# Patient Record
Sex: MALE | Race: WHITE | URBAN - METROPOLITAN AREA
[De-identification: names, ages, dates, MRNs, and addresses within clinical notes are randomized per-mention and may not be internally consistent; named-entity substitution may affect disease eponyms.]

---

## 2019-06-17 RX ORDER — IBUPROFEN 200 MG
600 TABLET ORAL EVERY 6 HOURS PRN
COMMUNITY

## 2019-06-17 ASSESSMENT — MIFFLIN-ST. JEOR: SCORE: 1624.89

## 2019-06-19 ENCOUNTER — HOSPITAL ENCOUNTER (OUTPATIENT)
Facility: CLINIC | Age: 29
Discharge: HOME | End: 2019-06-19
Attending: OPHTHALMOLOGY | Admitting: OPHTHALMOLOGY
Payer: COMMERCIAL

## 2019-06-19 ENCOUNTER — ANESTHESIA (OUTPATIENT)
Dept: SURGERY | Facility: CLINIC | Age: 29
End: 2019-06-19
Payer: COMMERCIAL

## 2019-06-19 ENCOUNTER — ANESTHESIA EVENT (OUTPATIENT)
Dept: SURGERY | Facility: CLINIC | Age: 29
End: 2019-06-19
Payer: COMMERCIAL

## 2019-06-19 VITALS
BODY MASS INDEX: 20.47 KG/M2 | HEIGHT: 70 IN | SYSTOLIC BLOOD PRESSURE: 94 MMHG | DIASTOLIC BLOOD PRESSURE: 55 MMHG | WEIGHT: 143 LBS | TEMPERATURE: 97.9 F | RESPIRATION RATE: 16 BRPM | OXYGEN SATURATION: 97 %

## 2019-06-19 PROCEDURE — 25000125 ZZHC RX 250: Performed by: ANESTHESIOLOGY

## 2019-06-19 PROCEDURE — 25000125 ZZHC RX 250

## 2019-06-19 PROCEDURE — 71000028 ZZH EYE RECOVERY PHASE 2 EACH 15 MINS: Performed by: OPHTHALMOLOGY

## 2019-06-19 PROCEDURE — 25000128 H RX IP 250 OP 636

## 2019-06-19 PROCEDURE — 25000125 ZZHC RX 250: Performed by: OPHTHALMOLOGY

## 2019-06-19 PROCEDURE — 40000170 ZZH STATISTIC PRE-PROCEDURE ASSESSMENT II: Performed by: OPHTHALMOLOGY

## 2019-06-19 PROCEDURE — V2632 POST CHMBR INTRAOCULAR LENS: HCPCS | Performed by: OPHTHALMOLOGY

## 2019-06-19 PROCEDURE — 25800030 ZZH RX IP 258 OP 636: Performed by: ANESTHESIOLOGY

## 2019-06-19 PROCEDURE — 25000128 H RX IP 250 OP 636: Performed by: OPHTHALMOLOGY

## 2019-06-19 PROCEDURE — 37000008 ZZH ANESTHESIA TECHNICAL FEE, 1ST 30 MIN: Performed by: OPHTHALMOLOGY

## 2019-06-19 PROCEDURE — 36000101 ZZH EYE SURGERY LEVEL 3 1ST 30 MIN: Performed by: OPHTHALMOLOGY

## 2019-06-19 PROCEDURE — 27210794 ZZH OR GENERAL SUPPLY STERILE: Performed by: OPHTHALMOLOGY

## 2019-06-19 PROCEDURE — 25000132 ZZH RX MED GY IP 250 OP 250 PS 637: Performed by: ANESTHESIOLOGY

## 2019-06-19 PROCEDURE — 25800030 ZZH RX IP 258 OP 636

## 2019-06-19 DEVICE — EYE IMP IOL AMO PCL TECNIS ZCB00 13.0: Type: IMPLANTABLE DEVICE | Site: EYE | Status: FUNCTIONAL

## 2019-06-19 RX ORDER — PROPARACAINE HYDROCHLORIDE 5 MG/ML
1 SOLUTION/ DROPS OPHTHALMIC ONCE
Status: COMPLETED | OUTPATIENT
Start: 2019-06-19 | End: 2019-06-19

## 2019-06-19 RX ORDER — PHENYLEPHRINE HYDROCHLORIDE 25 MG/ML
1 SOLUTION/ DROPS OPHTHALMIC
Status: COMPLETED | OUTPATIENT
Start: 2019-06-19 | End: 2019-06-19

## 2019-06-19 RX ORDER — TROPICAMIDE 10 MG/ML
1 SOLUTION/ DROPS OPHTHALMIC
Status: COMPLETED | OUTPATIENT
Start: 2019-06-19 | End: 2019-06-19

## 2019-06-19 RX ORDER — MOXIFLOXACIN 5 MG/ML
SOLUTION/ DROPS OPHTHALMIC PRN
Status: DISCONTINUED | OUTPATIENT
Start: 2019-06-19 | End: 2019-06-19 | Stop reason: HOSPADM

## 2019-06-19 RX ORDER — LIDOCAINE 40 MG/G
CREAM TOPICAL
Status: DISCONTINUED | OUTPATIENT
Start: 2019-06-19 | End: 2019-06-19 | Stop reason: HOSPADM

## 2019-06-19 RX ORDER — LIDOCAINE HYDROCHLORIDE 10 MG/ML
INJECTION, SOLUTION EPIDURAL; INFILTRATION; INTRACAUDAL; PERINEURAL PRN
Status: DISCONTINUED | OUTPATIENT
Start: 2019-06-19 | End: 2019-06-19 | Stop reason: HOSPADM

## 2019-06-19 RX ORDER — ACETAMINOPHEN 500 MG
1000 TABLET ORAL ONCE
Status: COMPLETED | OUTPATIENT
Start: 2019-06-19 | End: 2019-06-19

## 2019-06-19 RX ORDER — TIMOLOL 5 MG/ML
SOLUTION/ DROPS OPHTHALMIC PRN
Status: DISCONTINUED | OUTPATIENT
Start: 2019-06-19 | End: 2019-06-19 | Stop reason: HOSPADM

## 2019-06-19 RX ORDER — ONDANSETRON 2 MG/ML
INJECTION INTRAMUSCULAR; INTRAVENOUS PRN
Status: DISCONTINUED | OUTPATIENT
Start: 2019-06-19 | End: 2019-06-19

## 2019-06-19 RX ORDER — MOXIFLOXACIN 5 MG/ML
1 SOLUTION/ DROPS OPHTHALMIC
Status: COMPLETED | OUTPATIENT
Start: 2019-06-19 | End: 2019-06-19

## 2019-06-19 RX ORDER — BALANCED SALT SOLUTION 6.4; .75; .48; .3; 3.9; 1.7 MG/ML; MG/ML; MG/ML; MG/ML; MG/ML; MG/ML
SOLUTION OPHTHALMIC PRN
Status: DISCONTINUED | OUTPATIENT
Start: 2019-06-19 | End: 2019-06-19 | Stop reason: HOSPADM

## 2019-06-19 RX ORDER — DICLOFENAC SODIUM 1 MG/ML
1 SOLUTION/ DROPS OPHTHALMIC
Status: COMPLETED | OUTPATIENT
Start: 2019-06-19 | End: 2019-06-19

## 2019-06-19 RX ORDER — SODIUM CHLORIDE, SODIUM LACTATE, POTASSIUM CHLORIDE, CALCIUM CHLORIDE 600; 310; 30; 20 MG/100ML; MG/100ML; MG/100ML; MG/100ML
500 INJECTION, SOLUTION INTRAVENOUS CONTINUOUS
Status: DISCONTINUED | OUTPATIENT
Start: 2019-06-19 | End: 2019-06-19 | Stop reason: HOSPADM

## 2019-06-19 RX ORDER — TETRACAINE HYDROCHLORIDE 5 MG/ML
SOLUTION OPHTHALMIC PRN
Status: DISCONTINUED | OUTPATIENT
Start: 2019-06-19 | End: 2019-06-19 | Stop reason: HOSPADM

## 2019-06-19 RX ADMIN — DEXMEDETOMIDINE HYDROCHLORIDE 8 MCG: 100 INJECTION, SOLUTION INTRAVENOUS at 07:41

## 2019-06-19 RX ADMIN — DICLOFENAC SODIUM 1 DROP: 1 SOLUTION OPHTHALMIC at 06:33

## 2019-06-19 RX ADMIN — TROPICAMIDE 1 DROP: 10 SOLUTION/ DROPS OPHTHALMIC at 06:23

## 2019-06-19 RX ADMIN — PHENYLEPHRINE HYDROCHLORIDE 1 DROP: 25 SOLUTION/ DROPS OPHTHALMIC at 06:23

## 2019-06-19 RX ADMIN — TROPICAMIDE 1 DROP: 10 SOLUTION/ DROPS OPHTHALMIC at 06:33

## 2019-06-19 RX ADMIN — PROPARACAINE HYDROCHLORIDE 1 DROP: 5 SOLUTION/ DROPS OPHTHALMIC at 06:28

## 2019-06-19 RX ADMIN — PHENYLEPHRINE HYDROCHLORIDE 1 DROP: 25 SOLUTION/ DROPS OPHTHALMIC at 06:33

## 2019-06-19 RX ADMIN — MIDAZOLAM HYDROCHLORIDE 2 MG: 1 INJECTION, SOLUTION INTRAMUSCULAR; INTRAVENOUS at 07:33

## 2019-06-19 RX ADMIN — ONDANSETRON 4 MG: 2 INJECTION INTRAMUSCULAR; INTRAVENOUS at 07:37

## 2019-06-19 RX ADMIN — PROPARACAINE HYDROCHLORIDE 1 DROP: 5 SOLUTION/ DROPS OPHTHALMIC at 06:23

## 2019-06-19 RX ADMIN — MOXIFLOXACIN HYDROCHLORIDE 1 DROP: 5 SOLUTION/ DROPS OPHTHALMIC at 06:23

## 2019-06-19 RX ADMIN — DEXMEDETOMIDINE HYDROCHLORIDE 12 MCG: 100 INJECTION, SOLUTION INTRAVENOUS at 07:37

## 2019-06-19 RX ADMIN — MOXIFLOXACIN HYDROCHLORIDE 1 DROP: 5 SOLUTION/ DROPS OPHTHALMIC at 06:28

## 2019-06-19 RX ADMIN — POVIDONE-IODINE 1 DROP: 5 SOLUTION OPHTHALMIC at 06:23

## 2019-06-19 RX ADMIN — SODIUM CHLORIDE, POTASSIUM CHLORIDE, SODIUM LACTATE AND CALCIUM CHLORIDE 500 ML: 600; 310; 30; 20 INJECTION, SOLUTION INTRAVENOUS at 06:36

## 2019-06-19 RX ADMIN — TROPICAMIDE 1 DROP: 10 SOLUTION/ DROPS OPHTHALMIC at 06:28

## 2019-06-19 RX ADMIN — ACETAMINOPHEN 1000 MG: 500 TABLET, FILM COATED ORAL at 08:53

## 2019-06-19 RX ADMIN — DICLOFENAC SODIUM 1 DROP: 1 SOLUTION OPHTHALMIC at 06:29

## 2019-06-19 RX ADMIN — LIDOCAINE HYDROCHLORIDE: 10 INJECTION, SOLUTION EPIDURAL; INFILTRATION; INTRACAUDAL; PERINEURAL at 06:36

## 2019-06-19 RX ADMIN — PHENYLEPHRINE HYDROCHLORIDE 1 DROP: 25 SOLUTION/ DROPS OPHTHALMIC at 06:28

## 2019-06-19 RX ADMIN — DICLOFENAC SODIUM 1 DROP: 1 SOLUTION OPHTHALMIC at 06:32

## 2019-06-19 RX ADMIN — MIDAZOLAM HYDROCHLORIDE 1 MG: 1 INJECTION, SOLUTION INTRAMUSCULAR; INTRAVENOUS at 07:43

## 2019-06-19 RX ADMIN — MOXIFLOXACIN HYDROCHLORIDE 1 DROP: 5 SOLUTION/ DROPS OPHTHALMIC at 06:33

## 2019-06-19 SDOH — HEALTH STABILITY: MENTAL HEALTH: HOW OFTEN DO YOU HAVE A DRINK CONTAINING ALCOHOL?: NEVER

## 2019-06-19 ASSESSMENT — ENCOUNTER SYMPTOMS: SEIZURES: 0

## 2019-06-19 NOTE — ANESTHESIA POSTPROCEDURE EVALUATION
Patient: Nixon Shen    Procedure(s):  RIGHT EYE CATARACT EXTRACTION WITH CORNEA INTRAOCULAR LENS IMPLANT    Diagnosis:CATARACT RIGHT EYE  Diagnosis Additional Information: No value filed.    Anesthesia Type:  MAC    Note:  Anesthesia Post Evaluation    Patient location during evaluation: PACU  Patient participation: Able to fully participate in evaluation  Level of consciousness: awake  Pain management: adequate  Airway patency: patent  Cardiovascular status: acceptable  Respiratory status: acceptable  Hydration status: acceptable  PONV: none     Anesthetic complications: None          Last vitals:  Vitals:    06/19/19 0622 06/19/19 0800 06/19/19 0822   BP: 114/66 96/54 94/55   Resp: 16 16 16   Temp: 36.6  C (97.9  F)     SpO2: 98% 96% 97%         Electronically Signed By: Basim Mitchell MD  June 19, 2019  9:24 AM

## 2019-06-19 NOTE — OR NURSING
While waiting for friend to come for discharge instruction and to drive him home, pt started experiencing some irritation in the operated eye, anesthesiologist was around and gave verbal order for some tylenol, same was given with good effect as he verbalized feeling better. Friend arrived, discharge instruction given and they left in good condition.
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
## 2019-06-19 NOTE — ADDENDUM NOTE
Addendum  created 06/19/19 1135 by Priya Jones APRN CRNA    Intraprocedure Meds edited, Orders acknowledged in Narrator

## 2019-06-19 NOTE — DISCHARGE INSTRUCTIONS
[facility]  Post Operative Care  Following Cataract Surgery     ? If you have a gauze eye patch on, please do not remove it until it is removed by your physician at your first post-operative visit.    ? You may remove the clear eye shield and begin eye drops when you get home.    ? Wear the eye shield when sleeping for protection until your doctor stops its use.    ? Do not rub the operated eye.    ? Light sensitivity may be noticed. Sunglasses may be worn for comfort.    ? Some discomfort and irritation may be noticed. Acetaminophen (Tylenol) or Ibuprofen (Advil) may be taken for discomfort.    ? For irritation or minor discomfort, you may also use a clean cold washcloth held directly on the eye for 10 minute time periods, as needed.    ? Avoid bending over, strenuous activity or heavy lifting until approved by your doctor.    ? Keep the operated eye dry.    ? You may wash your hair, bathe or shower, but keep the operated eye closed while doing so.    ? Use medication exactly as prescribed by your doctor.  You may restart your regular home medications.    ? Bring all materials and medications to the clinic on your first post-operative visit.    ? Call the doctor s office if any of the following should occur:  -  any sudden vision change  -  nausea or a severe headache  -  increase in pain not controlled  -  increased amount of floaters (black spots in front of vision)  -  or signs of infection (pus, increasing redness or tenderness)            [facility] Discharge  Instructions  Anesthesia ([facility])   Adult Discharge Instructions    For 24 hours after surgery    1. Get plenty of rest.  Make arrangements to have a responsible adult stay with you for at least 24 hours after you leave the hospital.  2. Do not drive or use heavy equipment for 24 hours.    3. Do not drink alcohol for 24 hours.  4. Do not sign legal documents or make important decisions for 24  hours.  5. Avoid strenuous or risky activities. You may feel lightheaded.  If so, sit for a few minutes before standing.  Have someone help you get up.   6. Conscious sedation patients may resume a regular diet..  7. Any questions of medical nature, call your physician.

## 2019-06-19 NOTE — OP NOTE
Procedure Date: 06/19/2019      PREOPERATIVE DIAGNOSIS:  Cataract:  Nuclear sclerosis, posterior subcapsular, cortical right eye.         POSTOPERATIVE DIAGNOSIS:  Cataract:  Nuclear sclerosis, posterior subcapsular, cortical right eye.        OPERATIVE PROCEDURE:  [place] with IOL, right eye       ANESTHESIA: Topical with MAC       INDICATIONS FOR PROCEDURE:  A complete ophthalmic evaluation showed that the cataract was a significant cause of the decreased visual acuity.   The risks of cataract surgery including loss of vision, loss of the eye, hemorrhage, infection and other rare but possible side effects of surgery were discussed with the patient. The patient understood the risks and elected to proceed with surgery.       DESCRIPTION:  The patient's operative eye was dilated per the phacoemulsification standing orders.  The operative eye was anesthetized with 2% Lidocaine jelly 5 to 10 minutes before the surgical prep. The patient was then brought to the operating room where the operative eye was prepped and draped in the usual sterile fashion and a lid speculum was placed.  A grooved clear corneal incision was made at the temporal limbus of the eye.  This was 2.5mm in length, 1/3 corneal depth.  A paracentesis was made at the 12 o'clock position.  The anterior chamber was entered though the initial corneal groove with a keratome. Viscoelastic was used to deepen the anterior chamber.  A capsulotomy was performed with the cystotome employing the capsulorrhexis technique.  BSS was used to hydrodissect and hydrodelineate the nucleus.  The nucleus was then carefully emulsified using the crack and fracture technique.  The remaining nuclear fragments were carefully emulsified and aspirated.  The remaining cortex was carefully aspirated from the posterior capsular bag with the I&A handpiece.       Viscoelastic was used to re-inflate the posterior capsular bag and a lens was carefully inserted through the incision and  deposited into the bag with the haptics clearly within the bag.  The style and diopter of lens is listed on the implant label below.  Remaining viscoelastic was carefully aspirated from the anterior chamber.  The wound was closed by hydrating the cornea with BSS.  The wound was found to be water tight.  One drop each of  Vigamox and Combigan was given at the conclusion of the procedure.  There were no complications.  The patient tolerated the procedure well.      RIGHT EYE LENS INFORMATION:  [id number], 13.0 diopters.  Serial number is [id number].         KASIA HIGGINS MD             D: 2019   T: 2019   MT: DENNY      Name:     GABRIEL COOK   MRN:      [id number]        Account:        [id number]   :      1990           Procedure Date: 2019      Document: [id number]

## 2019-06-19 NOTE — ANESTHESIA CARE TRANSFER NOTE
Patient: Nixon Shen    Procedure(s):  RIGHT EYE CATARACT EXTRACTION WITH CORNEA INTRAOCULAR LENS IMPLANT    Diagnosis: CATARACT RIGHT EYE  Diagnosis Additional Information: No value filed.    Anesthesia Type:   MAC     Note:  Airway :Room Air  Patient transferred to:Phase II  Comments: Transferred to [facility] Center recovery room in recliner with armrests up, spontaneous respirations, O2 saturation maintained greater than 98% with oxygen via room air. All monitors and alarms on and functioning, clinically stable vital signs. Report given to recovery RN and questions answered. Patient alert and following verbal directions.Handoff Report: Identifed the Patient, Identified the Reponsible Provider, Reviewed the pertinent medical history, Discussed the surgical course, Reviewed Intra-OP anesthesia mangement and issues during anesthesia, Set expectations for post-procedure period and Allowed opportunity for questions and acknowledgement of understanding      Vitals: (Last set prior to Anesthesia Care Transfer)    CRNA VITALS  6/19/2019 0728 - 6/19/2019 0800      6/19/2019             Pulse:  67    SpO2:  97 %    Resp Rate (set):  10                Electronically Signed By: CLARIBEL Ulrich CRNA  June 19, 2019  8:00 AM

## 2019-06-19 NOTE — OR NURSING
Pt came out of OR, A&O, Baseline soft B.P otherwise VSS on RA, denies any pain, just c/o that eye feels a little bit weird, maybe from being numb.Denies nausea, tolerated muffin and some water. General condition is stable, discharge criteria met, waiting for his room mate to come for instruction and to drive him home.

## (undated) DEVICE — Device

## (undated) DEVICE — EYE KNIFE SLIT XSTAR VISITEC 2.4MM 45DEG BEVEL UP 373724

## (undated) DEVICE — TAPE MICROPORE 2"X1.5YD 1530S-2

## (undated) DEVICE — PACK CATARACT CUSTOM SO DALE SEY32CTFCX

## (undated) DEVICE — EYE PACK BVI READYPAK KIT #2

## (undated) DEVICE — EYE SOL BSS 500ML

## (undated) DEVICE — EYE PACK CUSTOM ANTERIOR 30DEG TIP CENTURION PPK6682-04

## (undated) DEVICE — GLOVE PROTEXIS MICRO 7.0  2D73PM70

## (undated) DEVICE — LINEN TOWEL PACK X5 5464

## (undated) DEVICE — GLOVE PROTEXIS MICRO 8.0  2D73PM80

## (undated) DEVICE — EYE SOL BSS 15ML BOTTLE 65079515

## (undated) DEVICE — EYE TIP IRRIGATION & ASPIRATION POLYMER 35D BENT 8065751511

## (undated) DEVICE — EYE SHIELD PLASTIC

## (undated) RX ORDER — GLYCOPYRROLATE 0.2 MG/ML
INJECTION, SOLUTION INTRAMUSCULAR; INTRAVENOUS
Status: DISPENSED
Start: 2019-06-19

## (undated) RX ORDER — ACETAMINOPHEN 500 MG
TABLET ORAL
Status: DISPENSED
Start: 2019-06-19

## (undated) RX ORDER — TIMOLOL 5 MG/ML
SOLUTION/ DROPS OPHTHALMIC
Status: DISPENSED
Start: 2019-06-19

## (undated) RX ORDER — MOXIFLOXACIN 5 MG/ML
SOLUTION/ DROPS OPHTHALMIC
Status: DISPENSED
Start: 2019-06-19

## (undated) RX ORDER — LIDOCAINE HYDROCHLORIDE 10 MG/ML
INJECTION, SOLUTION EPIDURAL; INFILTRATION; INTRACAUDAL; PERINEURAL
Status: DISPENSED
Start: 2019-06-19

## (undated) RX ORDER — TETRACAINE HYDROCHLORIDE 5 MG/ML
SOLUTION OPHTHALMIC
Status: DISPENSED
Start: 2019-06-19

## (undated) RX ORDER — DICLOFENAC SODIUM 1 MG/ML
SOLUTION/ DROPS OPHTHALMIC
Status: DISPENSED
Start: 2019-06-19

## (undated) RX ORDER — ONDANSETRON 2 MG/ML
INJECTION INTRAMUSCULAR; INTRAVENOUS
Status: DISPENSED
Start: 2019-06-19

## (undated) RX ORDER — PROPARACAINE HYDROCHLORIDE 5 MG/ML
SOLUTION/ DROPS OPHTHALMIC
Status: DISPENSED
Start: 2019-06-19

## (undated) RX ORDER — PHENYLEPHRINE HYDROCHLORIDE 25 MG/ML
SOLUTION/ DROPS OPHTHALMIC
Status: DISPENSED
Start: 2019-06-19

## (undated) RX ORDER — TROPICAMIDE 10 MG/ML
SOLUTION/ DROPS OPHTHALMIC
Status: DISPENSED
Start: 2019-06-19